# Patient Record
Sex: MALE | Race: WHITE | Employment: OTHER | URBAN - NONMETROPOLITAN AREA
[De-identification: names, ages, dates, MRNs, and addresses within clinical notes are randomized per-mention and may not be internally consistent; named-entity substitution may affect disease eponyms.]

---

## 2023-05-02 LAB
ALBUMIN SERPL-MCNC: 3.5 G/DL (ref 3.5–5.2)
ALP SERPL-CCNC: 85 U/L (ref 40–129)
ALT SERPL-CCNC: 9 U/L (ref 0–40)
ANION GAP SERPL CALCULATED.3IONS-SCNC: 13 MMOL/L (ref 7–16)
AST SERPL-CCNC: 22 U/L (ref 0–39)
BASOPHILS # BLD: 0.05 E9/L (ref 0–0.2)
BASOPHILS NFR BLD: 0.6 % (ref 0–2)
BILIRUB SERPL-MCNC: 0.5 MG/DL (ref 0–1.2)
BUN SERPL-MCNC: 9 MG/DL (ref 6–23)
CALCIUM SERPL-MCNC: 9 MG/DL (ref 8.6–10.2)
CHLORIDE SERPL-SCNC: 100 MMOL/L (ref 98–107)
CHOLESTEROL, TOTAL: 121 MG/DL (ref 0–199)
CO2 SERPL-SCNC: 22 MMOL/L (ref 22–29)
CREAT SERPL-MCNC: 0.7 MG/DL (ref 0.7–1.2)
EOSINOPHIL # BLD: 0.35 E9/L (ref 0.05–0.5)
EOSINOPHIL NFR BLD: 4.1 % (ref 0–6)
ERYTHROCYTE [DISTWIDTH] IN BLOOD BY AUTOMATED COUNT: 13.1 FL (ref 11.5–15)
GLUCOSE SERPL-MCNC: 83 MG/DL (ref 74–99)
HCT VFR BLD AUTO: 36.9 % (ref 37–54)
HDLC SERPL-MCNC: 31 MG/DL
HGB BLD-MCNC: 11.5 G/DL (ref 12.5–16.5)
IMM GRANULOCYTES # BLD: 0.04 E9/L
IMM GRANULOCYTES NFR BLD: 0.5 % (ref 0–5)
LDLC SERPL CALC-MCNC: 79 MG/DL (ref 0–99)
LYMPHOCYTES # BLD: 1.81 E9/L (ref 1.5–4)
LYMPHOCYTES NFR BLD: 21.2 % (ref 20–42)
MCH RBC QN AUTO: 30.8 PG (ref 26–35)
MCHC RBC AUTO-ENTMCNC: 31.2 % (ref 32–34.5)
MCV RBC AUTO: 98.9 FL (ref 80–99.9)
MONOCYTES # BLD: 0.87 E9/L (ref 0.1–0.95)
MONOCYTES NFR BLD: 10.2 % (ref 2–12)
NEUTROPHILS # BLD: 5.42 E9/L (ref 1.8–7.3)
NEUTS SEG NFR BLD: 63.4 % (ref 43–80)
PLATELET # BLD AUTO: 339 E9/L (ref 130–450)
PMV BLD AUTO: 10.2 FL (ref 7–12)
POTASSIUM SERPL-SCNC: 4.1 MMOL/L (ref 3.5–5)
PROT SERPL-MCNC: 6.1 G/DL (ref 6.4–8.3)
RBC # BLD AUTO: 3.73 E12/L (ref 3.8–5.8)
SODIUM SERPL-SCNC: 135 MMOL/L (ref 132–146)
TRIGL SERPL-MCNC: 53 MG/DL (ref 0–149)
VLDLC SERPL CALC-MCNC: 11 MG/DL
WBC # BLD: 8.5 E9/L (ref 4.5–11.5)

## 2023-05-09 ENCOUNTER — OUTSIDE SERVICES (OUTPATIENT)
Dept: FAMILY MEDICINE CLINIC | Age: 73
End: 2023-05-09
Payer: MEDICARE

## 2023-05-09 DIAGNOSIS — F41.9 ANXIETY DISORDER, UNSPECIFIED TYPE: ICD-10-CM

## 2023-05-09 DIAGNOSIS — E78.2 MIXED HYPERLIPIDEMIA: ICD-10-CM

## 2023-05-09 DIAGNOSIS — I10 ESSENTIAL HYPERTENSION: ICD-10-CM

## 2023-05-09 DIAGNOSIS — Z96.652 STATUS POST REVISION OF TOTAL REPLACEMENT OF LEFT KNEE: Primary | ICD-10-CM

## 2023-05-09 DIAGNOSIS — F32.A DEPRESSION, UNSPECIFIED DEPRESSION TYPE: ICD-10-CM

## 2023-05-09 PROCEDURE — 99309 SBSQ NF CARE MODERATE MDM 30: CPT | Performed by: NURSE PRACTITIONER

## 2023-05-09 NOTE — PROGRESS NOTES
5/9/2023    Jeffrey Held  1950    This resident is being seen today for a skilled evaluation visit. 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 He is a 67 y.o. male resident who is being seen today for skilled therapy services with which he has been on the benefit of PT/OT this is a gentleman who was admitted here for therapy services after a redo of a surgery to his left artificial knee and does have a cast intact. It has had some concerns regarding his great toe rubbing from the cast and has a toe sleeve and tactile protection. He states that he has no pain at this time, denies complaints respect to headaches or dizziness, sore throat, chest pain, coughing or shortness of breath, nausea or vomiting, constipation or diarrhea, fever or chills, syncopal events or seizure activity. Medications:  Docusate sodium 100 mg twice daily as needed  Cefadroxil 5 mg twice daily  Multivitamin daily  Allopurinol 100 mg daily  Amlodipine 5 mg daily  Gabapentin 600 mg at bedtime  Levocetirizine 5 mg at bedtime  Losartan 50 mg daily  Metoprolol succinate 25 mg twice daily  Gabapentin 3 mg bedtime as needed o  oxycodone/acetaminophen 5-3.5 mg every 6 hours as needed Eliquis 5 mg twice daily  Escitalopram 5 mg daily        Vital Signs: Stable and chart reviewed with nursing    Physical examination:Skin is essentially warm and dry. He does have a laparotomy scar to his abdomen. Left great toe with irritation from cast.  HEENT unremarkable. Neck is supple. Heart regular rate and rhythm. No rubs, gallops or murmurs noted. Lungs are clear to auscultation. No evidence of rhonchi, rales, or wheezing.  Abdomen is soft, supple and

## 2023-05-16 ENCOUNTER — OUTSIDE SERVICES (OUTPATIENT)
Dept: FAMILY MEDICINE CLINIC | Age: 73
End: 2023-05-16
Payer: MEDICARE
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
## 2023-05-16 DIAGNOSIS — G62.9 NEUROPATHY: ICD-10-CM

## 2023-05-16 DIAGNOSIS — G25.81 RESTLESS LEG SYNDROME: ICD-10-CM

## 2023-05-16 DIAGNOSIS — G47.33 OBSTRUCTIVE SLEEP APNEA: ICD-10-CM

## 2023-05-16 DIAGNOSIS — I10 ESSENTIAL HYPERTENSION: Primary | ICD-10-CM

## 2023-05-16 PROCEDURE — 99309 SBSQ NF CARE MODERATE MDM 30: CPT | Performed by: NURSE PRACTITIONER

## 2023-05-16 NOTE — PROGRESS NOTES
5/16/2023    Luz Raymond  1950    This resident is being seen today for a skilled evaluation visit. 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 He is a 67 y.o. male resident who is being seen today for a skilled evaluation with resident following with Department of orthopedics with a follow-up appointment scheduled for 6/8/2023. He did have recent staple removal on 5/11/2023 which he tolerated in a satisfactory manner. He does indicate that his pain is controlled and he does prefer Tylenol. He has no complaints regarding headaches or dizziness, sore throat, chest pain, coughing or shortness of breath, nausea or vomiting, constipation or diarrhea, fever or chills, falls or syncopal events. Medications:  Docusate sodium 100 mg twice daily as needed  Cefadroxil 5 mg twice daily  Multivitamin daily  Allopurinol 100 mg daily  Amlodipine 5 mg daily  Gabapentin 600 mg at bedtime  Levocetirizine 5 mg at bedtime  Losartan 50 mg daily  Metoprolol succinate 25 mg twice daily  Gabapentin 3 mg bedtime as needed o  oxycodone/acetaminophen 5-3.5 mg every 6 hours as needed   Eliquis 5 mg twice daily  Escitalopram 5 mg daily        Vital Signs: /68 pulse 55 respirations 18 temperature 96.3 O2 96% on room air      Physical examination:Skin is essentially warm and dry. He does have a laparotomy scar to his abdomen. Left great toe with irritation from cast.  HEENT unremarkable. Neck is supple. Heart regular rate and rhythm. No rubs, gallops or murmurs noted. Lungs are clear to auscultation. No evidence of rhonchi, rales, or wheezing. Abdomen is soft, supple and non-tender. Bowel sounds are noted x4 quadrants.

## 2023-05-23 ENCOUNTER — OUTSIDE SERVICES (OUTPATIENT)
Dept: FAMILY MEDICINE CLINIC | Age: 73
End: 2023-05-23

## 2023-05-23 DIAGNOSIS — Z96.652 STATUS POST REVISION OF TOTAL REPLACEMENT OF LEFT KNEE: ICD-10-CM

## 2023-05-23 DIAGNOSIS — E78.2 MIXED HYPERLIPIDEMIA: Primary | ICD-10-CM

## 2023-05-23 DIAGNOSIS — F41.9 ANXIETY DISORDER, UNSPECIFIED TYPE: ICD-10-CM

## 2023-05-23 DIAGNOSIS — F32.A DEPRESSION, UNSPECIFIED DEPRESSION TYPE: ICD-10-CM

## 2023-05-23 DIAGNOSIS — G62.9 NEUROPATHY: ICD-10-CM

## 2023-05-23 NOTE — PROGRESS NOTES
5/23/2023    [facility]  1950    This resident is being seen today for a skilled evaluation visit. 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 He is a 67 y.o. male resident who is being seen today for skilled services with this resident indicating that he has no pain and he only takes Tylenol for any pain management. He offers no complaints respect to headaches or dizziness, sore throat, chest pain, coughing or shortness of breath, nausea or vomiting, constipation or diarrhea, fever or chills, falls or syncopal events. Medications:  Docusate sodium 100 mg twice daily as needed  Cefadroxil 5 mg twice daily  Multivitamin daily  Allopurinol 100 mg daily  Amlodipine 5 mg daily  Gabapentin 600 mg at bedtime  Levocetirizine 5 mg at bedtime  Losartan 50 mg daily  Metoprolol succinate 25 mg twice daily  Gabapentin 3 mg bedtime as needed o  oxycodone/acetaminophen 5-3.5 mg every 6 hours as needed   Eliquis 5 mg twice daily  Escitalopram 5 mg daily        Vital Signs: /62 pulse 64 respirations 17 temperature 97.6 O2 94% on room air      Physical examination:Skin is essentially warm and dry. He does have a laparotomy scar to his abdomen. HEENT unremarkable. Neck is supple. Heart regular rate and rhythm. No rubs, gallops or murmurs noted. Lungs are clear to auscultation. No evidence of rhonchi, rales, or wheezing. Abdomen is soft, supple and non-tender. Bowel sounds are noted x4 quadrants. No rigidity, guarding or rebound tenderness. Negative Lowe's, negative McBurney's, negative Dominguez's. Extremities; no true pitting edema. Pulses are adequate. No clubbing  or no cyanosis noted.   Cast intact to

## 2023-06-19 ENCOUNTER — OUTSIDE SERVICES (OUTPATIENT)
Dept: FAMILY MEDICINE CLINIC | Age: 73
End: 2023-06-19
Payer: MEDICARE

## 2023-06-19 DIAGNOSIS — F41.9 ANXIETY DISORDER, UNSPECIFIED TYPE: ICD-10-CM

## 2023-06-19 DIAGNOSIS — G25.81 RESTLESS LEG SYNDROME: ICD-10-CM
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
## 2023-06-19 DIAGNOSIS — Z96.652 STATUS POST REVISION OF TOTAL REPLACEMENT OF LEFT KNEE: ICD-10-CM

## 2023-06-19 DIAGNOSIS — G62.9 NEUROPATHY: Primary | ICD-10-CM

## 2023-06-19 PROCEDURE — 99309 SBSQ NF CARE MODERATE MDM 30: CPT | Performed by: NURSE PRACTITIONER

## 2023-06-19 NOTE — PROGRESS NOTES
6/19/2023    Alicia Keith  1950    This resident is being seen today for a skilled evaluation visit. 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 He is a 67 y.o. male resident who is being seen today for skilled therapy services with which she does remain under assessment for his left knee and has started weightbearing as tolerated per the recommendations of Dr. Sarika Pelletier. He furthermore continues with both active range of motion and passive range of motion. He is to utilize his brace except for hygiene and sleeping. He states that his pain is controlled at this time and he offers no complaints with respect to headaches or dizziness, sore throat, chest pain, coughing or shortness of breath, nausea or vomiting, constipation or diarrhea, fever or chills, falls or syncopal events. Medications:  Docusate sodium 100 mg twice daily as needed  Cefadroxil 5 mg twice daily  Multivitamin daily  Allopurinol 100 mg daily  Amlodipine 5 mg daily  Gabapentin 600 mg at bedtime  Levocetirizine 5 mg at bedtime  Losartan 50 mg daily  Metoprolol succinate 25 mg twice daily  Gabapentin 3 mg bedtime as needed o  oxycodone/acetaminophen 5-3.5 mg every 6 hours as needed   Eliquis 5 mg twice daily  Escitalopram 5 mg daily        Vital Signs: /75 pulse 59 respirations 20 temperature 98.0 O2 92% on room air      Physical examination:Skin is essentially warm and dry. He does have a laparotomy scar to his abdomen. HEENT unremarkable. Neck is supple. Heart regular rate and rhythm. No rubs, gallops or murmurs noted. Lungs are clear to auscultation. No evidence of rhonchi, rales, or wheezing.  Abdomen is soft, supple and